# Patient Record
Sex: FEMALE | Race: WHITE | NOT HISPANIC OR LATINO | Employment: FULL TIME | URBAN - METROPOLITAN AREA
[De-identification: names, ages, dates, MRNs, and addresses within clinical notes are randomized per-mention and may not be internally consistent; named-entity substitution may affect disease eponyms.]

---

## 2018-10-02 ENCOUNTER — OFFICE VISIT (OUTPATIENT)
Dept: ENDOCRINOLOGY | Facility: CLINIC | Age: 38
End: 2018-10-02

## 2018-10-02 VITALS
SYSTOLIC BLOOD PRESSURE: 122 MMHG | BODY MASS INDEX: 41.95 KG/M2 | HEART RATE: 81 BPM | HEIGHT: 70 IN | WEIGHT: 293 LBS | DIASTOLIC BLOOD PRESSURE: 82 MMHG | OXYGEN SATURATION: 100 %

## 2018-10-02 DIAGNOSIS — E04.1 SOLITARY THYROID NODULE: Primary | ICD-10-CM

## 2018-10-02 DIAGNOSIS — E03.9 HYPOTHYROIDISM (ACQUIRED): ICD-10-CM

## 2018-10-02 PROCEDURE — 76536 US EXAM OF HEAD AND NECK: CPT | Performed by: INTERNAL MEDICINE

## 2018-10-02 PROCEDURE — 99243 OFF/OP CNSLTJ NEW/EST LOW 30: CPT | Performed by: INTERNAL MEDICINE

## 2018-10-02 RX ORDER — NORGESTIMATE AND ETHINYL ESTRADIOL 0.25-0.035
1 KIT ORAL DAILY
COMMUNITY

## 2018-10-02 RX ORDER — LEVOTHYROXINE SODIUM 0.05 MG/1
50 TABLET ORAL DAILY
Qty: 30 TABLET | Refills: 3 | Status: SHIPPED | OUTPATIENT
Start: 2018-10-02

## 2018-10-02 NOTE — PROGRESS NOTES
Goiter (Consult & Ultra Sound )    Subjective   Christy Turner is a 37 y.o. female. she is being seen for consultation today at the request of  Monica Cho APRN for evaluation of   Thyroid nodule dx in 3/2018 when she presented for evaluation of abnormal thyroid function. Her TSH was 5.3 and she started levothyroxine. Currently takes 50 mcg levothyroxine daily.   Ultrasound from 2018 reviewed and described 5 mm nodule in the right lobe and 1.2 x 0.5 cm nodule in the left lobe.    Patient reported family history of graves disease in her maternal grandmother. No history of goiter or thyroid cancer.      History of Present Illness  The following portions of the patient's history were reviewed and updated as appropriate: She  has a past medical history of Acid reflux; Anemia; Anemia; Colitis; Habitual aborter not currently pregnant; and Hypertension.  She  has a past surgical history that includes  section (); [place] tooth extraction;  section (10/2016); and Gastric Sleeve.  Her family history includes Arthritis in her maternal grandfather; Diabetes in her mother; Hyperlipidemia in her mother; Thyroid disease in her maternal grandmother and mother.  She  reports that she quit smoking about 3 years ago. She started smoking about 22 years ago. She does not have any smokeless tobacco history on file. She reports that she does not drink alcohol or use drugs.  She is allergic to latex and sulfa antibiotics..    Medications:    Current Outpatient Prescriptions:   •  diclofenac (FLECTOR) 1.3 % patch patch, Apply 1 patch topically to the appropriate area as directed 2 (Two) Times a Day., Disp: , Rfl:   •  norgestimate-ethinyl estradiol (SPRINTEC 28) 0.25-35 MG-MCG per tablet, Take 1 tablet by mouth Daily., Disp: , Rfl:   •  Prenatal Vit-Fe Fumarate-FA (PRENATAL VITAMIN 27-0.8) 27-0.8 MG tablet tablet, Take 1 tablet by mouth daily., Disp: , Rfl:   •  levothyroxine (SYNTHROID) 50 MCG tablet, Take  "1 tablet by mouth Daily. On empty stomach, Disp: 30 tablet, Rfl: 3    The following portions of the patient's history were reviewed and updated as appropriate: allergies, current medications, past family history, past medical history, past social history, past surgical history and problem list.    Review of Systems   Constitutional: Positive for unexpected weight loss.   Eyes: Positive for itching.   Cardiovascular: Positive for leg swelling.   Genitourinary: Positive for menstrual problem.   Neurological: Positive for numbness.   All other systems reviewed and are negative.      Objective   Blood pressure 122/82, pulse 81, height 177.8 cm (70\"), weight (!) 137 kg (302 lb), SpO2 100 %, currently breastfeeding.   Physical Exam   Constitutional: She is oriented to person, place, and time. She appears well-developed and well-nourished.   Morbidly obese   HENT:   Head: Normocephalic and atraumatic.   Eyes: Pupils are equal, round, and reactive to light. Conjunctivae and EOM are normal.   Neck: Thyromegaly (no nodules palpated) present.   Cardiovascular: Normal rate, regular rhythm, normal heart sounds and intact distal pulses.    Pulmonary/Chest: Effort normal and breath sounds normal.   Musculoskeletal: She exhibits no edema.   Lymphadenopathy:     She has no cervical adenopathy.   Neurological: She is alert and oriented to person, place, and time.   Skin: Skin is dry.   Psychiatric: She has a normal mood and affect. Thought content normal.         Results for orders placed or performed during the hospital encounter of 10/25/16   CBC (No Diff)   Result Value Ref Range    WBC 7.29 3.50 - 10.80 10*3/mm3    RBC 5.08 3.89 - 5.14 10*6/mm3    Hemoglobin 13.5 11.5 - 15.5 g/dL    Hematocrit 40.9 34.5 - 44.0 %    MCV 80.5 80.0 - 99.0 fL    MCH 26.6 (L) 27.0 - 31.0 pg    MCHC 33.0 32.0 - 36.0 g/dL    RDW 15.7 (H) 11.3 - 14.5 %    RDW-SD 46.2 37.0 - 54.0 fl    MPV 10.6 6.0 - 12.0 fL    Platelets 190 150 - 450 10*3/mm3   CBC Auto " Differential   Result Value Ref Range    WBC 8.15 3.50 - 10.80 10*3/mm3    RBC 4.30 3.89 - 5.14 10*6/mm3    Hemoglobin 11.3 (L) 11.5 - 15.5 g/dL    Hematocrit 36.0 34.5 - 44.0 %    MCV 83.7 80.0 - 99.0 fL    MCH 26.3 (L) 27.0 - 31.0 pg    MCHC 31.4 (L) 32.0 - 36.0 g/dL    RDW 16.2 (H) 11.3 - 14.5 %    RDW-SD 49.4 37.0 - 54.0 fl    MPV 10.5 6.0 - 12.0 fL    Platelets 160 150 - 450 10*3/mm3    Neutrophil % 72.1 (H) 41.0 - 71.0 %    Lymphocyte % 17.2 (L) 24.0 - 44.0 %    Monocyte % 7.2 0.0 - 12.0 %    Eosinophil % 2.6 0.0 - 3.0 %    Basophil % 0.5 0.0 - 1.0 %    Immature Grans % 0.4 0.0 - 0.6 %    Neutrophils, Absolute 5.88 1.50 - 8.30 10*3/mm3    Lymphocytes, Absolute 1.40 0.60 - 4.80 10*3/mm3    Monocytes, Absolute 0.59 0.00 - 1.00 10*3/mm3    Eosinophils, Absolute 0.21 0.10 - 0.30 10*3/mm3    Basophils, Absolute 0.04 0.00 - 0.20 10*3/mm3    Immature Grans, Absolute 0.03 0.00 - 0.03 10*3/mm3   Type & Screen   Result Value Ref Range    ABO Type O     RH type Negative     Antibody Screen Negative    Fetal Bleed Screen   Result Value Ref Range    Fetal Bleed Screen Negative    Postpartum RhIg Evaluation   Result Value Ref Range    ABO Type O     RH type Negative     Antibody Screen Negative    Doses of Rh Immune Globulin   Result Value Ref Range    Number of Doses Fetal Screen Negative - Recommend 1 vial of RhIg              Thyroid ultrasound 10/02/18              Real time high resolution imaging of the thyroid gland was performed in transverse and longitudinal planes.   Previous images were reviewed and compared to the current appearance to assess stability.       The right lobe measured 4.76 cm L x 1.88 cm AP x 1.59 cm in TV dimension.    The isthmus measured 0.76 cm in thickness.    The left thyroid lobe measured 4.53 cm L x 1.27 cm AP x 2 cm in TV dimension.    Thyroid gland is heterogeneous and contains small 3 x 4 mm nodule in the right lobe.   Left lobe nodule describe on previous u/s is no longer  present      No pathologic lymph nodes were seen.      Assessment: heterogeneous thyroid gland. Resolution of the left thyroid nodule.   Repeat ultrasound in 12 months.    Assessment/Plan:    Problem List Items Addressed This Visit        Endocrine    Hypothyroidism (acquired)    Relevant Medications    levothyroxine (SYNTHROID) 50 MCG tablet      Other Visit Diagnoses     Solitary thyroid nodule    -  Primary    Relevant Medications    levothyroxine (SYNTHROID) 50 MCG tablet    Other Relevant Orders    US Thyroid (Completed)    TSH    T4, Free    Thyroid Peroxidase Antibody      Repeat thyroid function and adjust medications as indicated.   Thyroid u/s showed resolution of the thyroid nodule. No indication for the routine u/s exams or FNA>   I will see patient back in 1 year

## 2018-10-04 LAB
T4 FREE SERPL-MCNC: 1.16 NG/DL (ref 0.89–1.76)
THYROPEROXIDASE AB SERPL-ACNC: 99 IU/ML (ref 0–34)
TSH SERPL DL<=0.005 MIU/L-ACNC: 3.05 MIU/ML (ref 0.35–5.35)